# Patient Record
Sex: FEMALE | Race: WHITE | NOT HISPANIC OR LATINO | URBAN - METROPOLITAN AREA
[De-identification: names, ages, dates, MRNs, and addresses within clinical notes are randomized per-mention and may not be internally consistent; named-entity substitution may affect disease eponyms.]

---

## 2023-01-14 ENCOUNTER — EMERGENCY (EMERGENCY)
Facility: HOSPITAL | Age: 43
LOS: 1 days | Discharge: HOME | End: 2023-01-14
Attending: EMERGENCY MEDICINE | Admitting: EMERGENCY MEDICINE
Payer: COMMERCIAL

## 2023-01-14 VITALS
SYSTOLIC BLOOD PRESSURE: 139 MMHG | DIASTOLIC BLOOD PRESSURE: 80 MMHG | TEMPERATURE: 98 F | HEART RATE: 78 BPM | RESPIRATION RATE: 18 BRPM | OXYGEN SATURATION: 99 %

## 2023-01-14 VITALS
WEIGHT: 149.91 LBS | HEART RATE: 77 BPM | HEIGHT: 69 IN | DIASTOLIC BLOOD PRESSURE: 79 MMHG | RESPIRATION RATE: 18 BRPM | OXYGEN SATURATION: 98 % | TEMPERATURE: 98 F | SYSTOLIC BLOOD PRESSURE: 138 MMHG

## 2023-01-14 DIAGNOSIS — T78.49XA OTHER ALLERGY, INITIAL ENCOUNTER: ICD-10-CM
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
## 2023-01-14 DIAGNOSIS — R19.7 DIARRHEA, UNSPECIFIED: ICD-10-CM

## 2023-01-14 PROCEDURE — 99284 EMERGENCY DEPT VISIT MOD MDM: CPT

## 2023-01-14 RX ORDER — DEXAMETHASONE 0.5 MG/5ML
10 ELIXIR ORAL ONCE
Refills: 0 | Status: COMPLETED | OUTPATIENT
Start: 2023-01-14 | End: 2023-01-14

## 2023-01-14 RX ORDER — EPINEPHRINE 0.3 MG/.3ML
1 INJECTION INTRAMUSCULAR; SUBCUTANEOUS
Qty: 1 | Refills: 0
Start: 2023-01-14

## 2023-01-14 RX ADMIN — Medication 10 MILLIGRAM(S): at 14:21

## 2023-01-14 NOTE — ED PROVIDER NOTE - NSFOLLOWUPINSTRUCTIONS_ED_ALL_ED_FT
How to Use an Auto-Injector Pen      An auto-injector pen (pre-filled automatic epinephrine injection device) is a device that is used to deliver epinephrine to the body. Epinephrine is a medicine that is given as a shot (injection). It works by relaxing the muscles in the airways and tightening the blood vessels. It is used to treat:  •A life-threatening allergic reaction (anaphylaxis).      •Serious breathing problems, such as severe asthma attacks, some lung problems, and other emergency conditions.      An epinephrine injection can save your life. You should always carry an auto-injector pen with you if you are at risk for severe asthma attacks or anaphylaxis. You may hear other names for an auto-injector pen. They are epinephrine injection, epinephrine auto-injector pen, epinephrine pen, and automatic injection device.      What are the risks?    Using the auto-injector pen is safe. However, problems may arise, including:  •Damage to bone or tissue. Make sure that you correctly place the needle in the muscle of your outer thigh as told by your health care provider.        When should I use my auto-injector pen?    Use your auto-injector pen as soon as you think you are experiencing anaphylaxis or a severe asthma attack. Anaphylaxis is very dangerous if it is not treated right away.    Signs and symptoms of anaphylaxis may include:  •Feeling warm in the face (flushed). This may include redness.      •Itchy, red, swollen areas of skin (hives).      •Swelling of the eyes, lips, face, mouth, tongue, or throat.      •Difficulty breathing, speaking, or swallowing.      •Noisy breathing (wheezing).      •Dizziness, light-headedness, or fainting.      •Pain or cramping in the abdomen.      •Vomiting or diarrhea.      These symptoms may represent a serious problem that is an emergency. Do not wait to see if the symptoms will go away. Use your auto-injector pen as you have been instructed, and get medical help right away. Call your local emergency services (911 in the [place] Do not drive yourself to the hospital.      General tips for using an auto-injector pen  A person using an epinephrine auto-injector in the thigh.   •Use epinephrine exactly as told by your health care provider. Do not inject it more often or in greater or smaller doses than your health care provider told you. Most auto-injector pens contain one dose of epinephrine. Some contain two doses.      •Sit or lie down before giving yourself an injection or before receiving an injection from someone else.    •Use the auto-injector pen to give yourself an injection under your skin or into your muscle on the outer side of your thigh. Do not give yourself an injection into your buttocks or any other part of your body.  •In an emergency, you can use your auto-injector pen through your clothing.      •After you inject a dose of epinephrine, some liquid may remain in your auto-injector pen. This is normal.        •Replace your epinephrine immediately after you use your auto-injector pen. This is important if you have another reaction. If possible, carry two epinephrine auto-injector pens.      •If you need to give yourself a second dose of epinephrine, give the second injection in another area on your outer thigh. Do not give two injections in exactly the same place on your body. This can lead to tissue damage.    •From time to time:   •Check the expiration date on your auto-injector pen.       •Check the solution to ensure that it is not cloudy and that there are no particles floating in it. If your auto-injector pen is  or if the solution is cloudy or has particles floating in it, throw it away and get a new one.        •Ask your health care provider how to safely get rid of used or  auto-injector pens.      •Talk with your pharmacist or health care provider if you have questions about how to inject epinephrine correctly.        Get help right away if:    •You inject epinephrine. If you use epinephrine, you must still get emergency medical treatment, even if the medicine seems to be working.    •You may need additional medical care, and you may need to be monitored for the side effects of epinephrine. The side effects include:  •Fast or irregular heartbeat.      •Nervousness or anxiety with shaking that does not stop.      •Difficulty breathing.      •Sweating.      •Headache.      •Nausea or vomiting.      •Dizziness or weakness.          Summary    •An auto-injector pen (pre-filled automatic epinephrine injection device) is a device that is used to deliver epinephrine to the body.      •An auto-injector pen is used to treat a life-threatening allergic reaction (anaphylaxis), asthma attack, or other emergency conditions.      •You should always carry an auto-injector pen with you if you are at risk for anaphylaxis or severe asthma attacks.      •Use of this device is safe. However, bone or tissue damage can occur if you do not follow instructions for injecting the medicine.      •Talk with your pharmacist or health care provider if you have questions about how to inject epinephrine correctly.      This information is not intended to replace advice given to you by your health care provider. Make sure you discuss any questions you have with your health care provider.      Document Revised: 2022 Document Reviewed: 2022    Elsevier Patient Education 2022 [employer]

## 2023-01-14 NOTE — ED PROVIDER NOTE - PHYSICAL EXAMINATION
Const: NAD  Eyes: PERRL, no conjunctival injection  HENT:  Neck supple without meningismus, no stridor, no hoarse voice, no drooling, midline non-swollen uvula, no tonsilar exudate or swelling   CV: RRR, Warm, well-perfused extremities  RESP: CTA B/L, no tachypnea   GI: soft, non-tender, non-distended  MSK: No gross deformities appreciated  Skin: Warm, dry. No rashes  Neuro: Alert, CNs II-XII grossly intact. Sensation and motor function of extremities grossly intact.  Psych: Appropriate mood and affect.

## 2023-01-14 NOTE — ED PROVIDER NOTE - OBJECTIVE STATEMENT
42 history presents to the emergency department for allergic reaction.  Patient states she drank some liquid IV and then went to [facility] and during it noticed that she had some tingling around her mouth and felt like her throat was tight.  She went to the bathroom and had 3 episodes of diarrhea and went back to her hotel.  Patient took 50 mg of Benadryl about 1 hour prior to arrival and states that she feels improved now. No Change in voice no stridor no rashes no shortness of breath.  Patient has no allergies

## 2023-01-14 NOTE — ED ADULT TRIAGE NOTE - CHIEF COMPLAINT QUOTE
here for tingling to throat and diarrhea s/p eating hazelnuts and a Liquid IV drink. Pt is A+Ox3 speaking in complete sentences. Self medicated with 50mg of Benadryl 1 hr PTA and is NAD- Uvula visualized

## 2023-01-14 NOTE — ED PROVIDER NOTE - PATIENT PORTAL LINK FT
You can access the FollowMyHealth Patient Portal offered by [facility] by registering at the following website: [email or web address]. By joining [employer]’s FollowMyHealth portal, you will also be able to view your health information using other applications (apps) compatible with our system.

## 2023-01-19 NOTE — ED POST DISCHARGE NOTE - OTHER COMMUNICATION
Patient called, was unable to fill Rx in [place] and is now home in [place]. I called in Rx to her local pharmacy.

## 2023-02-23 NOTE — ED PROVIDER NOTE - IV ALTEPLASE EXCL ABS HIDDEN
No new care gaps identified.  [facility] Embedded Care Gaps. Reference number: [id number]. 2/23/2023   9:46:08 AM CST   show